# Patient Record
Sex: FEMALE | Race: WHITE | NOT HISPANIC OR LATINO | Employment: OTHER | URBAN - METROPOLITAN AREA
[De-identification: names, ages, dates, MRNs, and addresses within clinical notes are randomized per-mention and may not be internally consistent; named-entity substitution may affect disease eponyms.]

---

## 2024-02-19 ENCOUNTER — OFFICE VISIT (OUTPATIENT)
Dept: INTERNAL MEDICINE | Facility: CLINIC | Age: 69
End: 2024-02-19
Payer: MEDICARE

## 2024-02-19 VITALS
HEART RATE: 64 BPM | SYSTOLIC BLOOD PRESSURE: 114 MMHG | DIASTOLIC BLOOD PRESSURE: 68 MMHG | WEIGHT: 148.2 LBS | RESPIRATION RATE: 16 BRPM | BODY MASS INDEX: 22.46 KG/M2 | HEIGHT: 68 IN | OXYGEN SATURATION: 97 % | TEMPERATURE: 96.8 F

## 2024-02-19 DIAGNOSIS — Z11.59 ENCOUNTER FOR HEPATITIS C SCREENING TEST FOR LOW RISK PATIENT: ICD-10-CM

## 2024-02-19 DIAGNOSIS — Z12.31 ENCOUNTER FOR SCREENING MAMMOGRAM FOR MALIGNANT NEOPLASM OF BREAST: ICD-10-CM

## 2024-02-19 DIAGNOSIS — E03.9 HYPOTHYROIDISM (ACQUIRED): Primary | ICD-10-CM

## 2024-02-19 DIAGNOSIS — Z12.11 COLON CANCER SCREENING: ICD-10-CM

## 2024-02-19 DIAGNOSIS — Z78.0 POSTMENOPAUSAL: ICD-10-CM

## 2024-02-19 RX ORDER — LEVOTHYROXINE SODIUM 0.12 MG/1
125 TABLET ORAL
Qty: 30 TABLET | Refills: 3 | Status: SHIPPED | OUTPATIENT
Start: 2024-02-19

## 2024-02-19 NOTE — PROGRESS NOTES
Subjective   Patricia Behrens is a 68 y.o. female here to establish care.  Chief Complaint   Patient presents with    [facility]    Hypothyroidism       History of Present Illness   The patient is a 68-year-old female who is here to establish care. She has hypothyroidism and is in need of a refill of her levothyroxine. She currently takes 125 mcg; however, she has not been taking this routinely. She did move to [place] in the summer last year to be near family and has not had routine care since then.    She reports she had an overactive thyroid as a kid. She states in her 40s or 50s, she was informed she has an underactive thyroid. She acknowledges she has been on medication for several years. She denies undergoing any procedures or surgeries on her thyroid. She states she ran out of her levothyroxine. She notes she presented to urgent care a few months ago for strep throat and was given a prescription for a month. She took it for a month and never followed up. She reports she is falling asleep and gaining weight. She states she did take levothyroxine 150 mcg in the past but dose had to be reduced to 125mcg and she was stable on that.     She reports it has been several years since her last mammogram. Her last bone density screening was more than 2 years ago. She had a Cologuard approximately 5 years ago, which was negative. She obtained a colonoscopy in the past and notes she woke up in the middle of the procedure. She notes she has not had a Medicare wellness visit before.     She states her brother  of rectal cancer.    She declines the COVID-19 vaccination today. She is unsure when she obtained her tetanus vaccination. She reports she obtained a whooping cough vaccination 12 years ago. She declines the shingles vaccination and RSV vaccination.      The following portions of the patient's history were reviewed and updated as appropriate: allergies, current medications, past family history, past medical  "history, past social history, past surgical history and problem list.    Review of Systems   Constitutional:  Positive for fatigue and unexpected weight gain. Negative for fever and unexpected weight loss.   Eyes:  Negative for blurred vision, double vision and visual disturbance.   Respiratory:  Negative for cough, shortness of breath and wheezing.    Cardiovascular:  Negative for chest pain, palpitations and leg swelling.   Gastrointestinal:  Negative for abdominal pain, constipation, diarrhea, nausea and vomiting.   Genitourinary:  Negative for difficulty urinating, frequency and urgency.   Musculoskeletal:  Negative for arthralgias and myalgias.   Skin:  Negative for color change and rash.   Neurological:  Negative for dizziness, weakness and headache.   Hematological:  Negative for adenopathy. Does not bruise/bleed easily.           No Known Allergies  Past Medical History:   Diagnosis Date    Disease of thyroid gland     Hypothyroidism      Past Surgical History:   Procedure Laterality Date     SECTION      OTHER SURGICAL HISTORY      cervical ribs     Family History   Problem Relation Age of Onset    Cancer Brother         rectal     Social History     Socioeconomic History    Marital status:    Tobacco Use    Smoking status: Never    Smokeless tobacco: Never   Vaping Use    Vaping Use: Never used   Substance and Sexual Activity    Alcohol use: Yes     Alcohol/week: 2.0 standard drinks of alcohol     Types: 2 Glasses of wine per week    Drug use: Never    Sexual activity: Not Currently     Immunization History   Administered Date(s) Administered    Tdap 2012       Current Outpatient Medications:     levothyroxine (SYNTHROID, LEVOTHROID) 125 MCG tablet, Take 1 tablet by mouth Every Morning., Disp: 30 tablet, Rfl: 3    Objective   Blood pressure 114/68, pulse 64, temperature 96.8 °F (36 °C), temperature source Infrared, resp. rate 16, height 172.7 cm (68\"), weight 67.2 kg (148 lb " 3.2 oz), SpO2 97%.  Physical Exam  Constitutional:       Appearance: Normal appearance. She is well-developed.   HENT:      Head: Normocephalic and atraumatic.   Eyes:      General: No scleral icterus.     Conjunctiva/sclera: Conjunctivae normal.   Neck:      Comments: No thyromegaly. No carotid bruits.  Cardiovascular:      Rate and Rhythm: Normal rate and regular rhythm.      Heart sounds: Normal heart sounds.   Pulmonary:      Effort: Pulmonary effort is normal. No respiratory distress.      Breath sounds: Normal breath sounds.   Abdominal:      General: Bowel sounds are normal.      Palpations: Abdomen is soft.      Tenderness: There is no abdominal tenderness.   Musculoskeletal:         General: Normal range of motion.      Cervical back: Normal range of motion.      Right lower leg: No edema.      Left lower leg: No edema.   Skin:     General: Skin is warm and dry.   Neurological:      General: No focal deficit present.      Mental Status: She is alert and oriented to person, place, and time.   Psychiatric:         Attention and Perception: Attention normal.         Mood and Affect: Mood and affect normal.         Behavior: Behavior normal. Behavior is cooperative.         Thought Content: Thought content normal.         Cognition and Memory: Cognition normal.         Judgment: Judgment normal.         Assessment & Plan   Diagnoses and all orders for this visit:    1. Hypothyroidism (acquired) (Primary)  -     TSH Rfx On Abnormal To Free T4; Future  -     CBC (No Diff); Future  -     Comprehensive Metabolic Panel; Future  -     Lipid Panel; Future  -     levothyroxine (SYNTHROID, LEVOTHROID) 125 MCG tablet; Take 1 tablet by mouth Every Morning.  Dispense: 30 tablet; Refill: 3    2. Encounter for screening mammogram for malignant neoplasm of breast  -     Mammo screening digital tomosynthesis bilateral w CAD; Future  -     CBC (No Diff); Future  -     Comprehensive Metabolic Panel; Future  -     Lipid Panel;  Future    3. Postmenopausal  -     DEXA Bone Density Axial; Future  -     CBC (No Diff); Future  -     Comprehensive Metabolic Panel; Future  -     Lipid Panel; Future    4. Colon cancer screening  -     Cologuard - Stool, Per Rectum; Future    5. Encounter for hepatitis C screening test for low risk patient  -     HCV Antibody Rfx To Qnt PCR; Future  -     CBC (No Diff); Future  -     Comprehensive Metabolic Panel; Future  -     Lipid Panel; Future      Hypothyroidism.  - I will go ahead and get her started back on the levothyroxine 125 mcg daily. Plan to check her thyroid labs in 3 months. Labs ordered and she can get these prior to our next visit.    Preventative care.  - Mammogram ordered. Bone density ordered. Cologuard ordered. We will screen for hepatitis C with labs prior to next visit.         Return in about 3 months (around 5/19/2024) for Medicare Wellness with labs a few days prior.  Plan of care discussed with pt. They verbalized understanding and agreement.     Transcribed from ambient dictation for FAMILIA Adames by Elmer Pedroza.  02/19/24   12:33 EST    Patient or patient representative verbalized consent to the visit recording.  I have personally performed the services described in this document as transcribed by the above individual, and it is both accurate and complete.  FAMILIA Adames  2/19/2024  18:42 EST

## 2024-02-23 ENCOUNTER — PATIENT ROUNDING (BHMG ONLY) (OUTPATIENT)
Dept: INTERNAL MEDICINE | Facility: CLINIC | Age: 69
End: 2024-02-23
Payer: MEDICARE

## 2024-02-23 NOTE — PROGRESS NOTES
My name is Lee Ann Beck and I am a patient experience representative for [facility] on [facility].    I would like to officially welcome you to our practice.  If you do not mind I would like to ask you a few questions about your recent visit with us.  If you do not have the time to reply that is perfectly fine.      First, could you tell me what went well with your recent visit?     Secondly, we are always looking for ways to make our patients' experiences even better. Do you have any recommendations on ways we may improve?     Third, safety is a top priority therefore do you have any concerns with that while in our office?    Finally, overall were you satisfied with your first visit to our practice?     Thank you for taking the time to answer a few questions today.  In the coming days you will receive a survey.  We encourage you to complete the survey to let us know how we did!        Lee Ann

## 2024-05-21 ENCOUNTER — OFFICE VISIT (OUTPATIENT)
Dept: INTERNAL MEDICINE | Facility: CLINIC | Age: 69
End: 2024-05-21
Payer: MEDICARE

## 2024-05-21 ENCOUNTER — LAB (OUTPATIENT)
Dept: INTERNAL MEDICINE | Facility: CLINIC | Age: 69
End: 2024-05-21
Payer: MEDICARE

## 2024-05-21 VITALS
TEMPERATURE: 96.8 F | SYSTOLIC BLOOD PRESSURE: 118 MMHG | OXYGEN SATURATION: 99 % | WEIGHT: 137.6 LBS | BODY MASS INDEX: 20.85 KG/M2 | DIASTOLIC BLOOD PRESSURE: 66 MMHG | HEART RATE: 64 BPM | RESPIRATION RATE: 18 BRPM | HEIGHT: 68 IN

## 2024-05-21 DIAGNOSIS — E03.9 HYPOTHYROIDISM (ACQUIRED): ICD-10-CM

## 2024-05-21 DIAGNOSIS — Z78.0 POSTMENOPAUSAL: ICD-10-CM

## 2024-05-21 DIAGNOSIS — Z11.59 ENCOUNTER FOR HEPATITIS C SCREENING TEST FOR LOW RISK PATIENT: ICD-10-CM

## 2024-05-21 DIAGNOSIS — Z12.31 ENCOUNTER FOR SCREENING MAMMOGRAM FOR MALIGNANT NEOPLASM OF BREAST: ICD-10-CM

## 2024-05-21 DIAGNOSIS — Z00.00 ENCOUNTER FOR SUBSEQUENT ANNUAL WELLNESS VISIT (AWV) IN MEDICARE PATIENT: Primary | ICD-10-CM

## 2024-05-21 LAB
DEPRECATED RDW RBC AUTO: 41.6 FL (ref 37–54)
ERYTHROCYTE [DISTWIDTH] IN BLOOD BY AUTOMATED COUNT: 12.3 % (ref 12.3–15.4)
HCT VFR BLD AUTO: 39.7 % (ref 34–46.6)
HGB BLD-MCNC: 13.4 G/DL (ref 12–15.9)
MCH RBC QN AUTO: 31.5 PG (ref 26.6–33)
MCHC RBC AUTO-ENTMCNC: 33.8 G/DL (ref 31.5–35.7)
MCV RBC AUTO: 93.4 FL (ref 79–97)
PLATELET # BLD AUTO: 221 10*3/MM3 (ref 140–450)
PMV BLD AUTO: 10.3 FL (ref 6–12)
RBC # BLD AUTO: 4.25 10*6/MM3 (ref 3.77–5.28)
WBC NRBC COR # BLD AUTO: 4.98 10*3/MM3 (ref 3.4–10.8)

## 2024-05-21 PROCEDURE — 1160F RVW MEDS BY RX/DR IN RCRD: CPT | Performed by: NURSE PRACTITIONER

## 2024-05-21 PROCEDURE — 1170F FXNL STATUS ASSESSED: CPT | Performed by: NURSE PRACTITIONER

## 2024-05-21 PROCEDURE — 99213 OFFICE O/P EST LOW 20 MIN: CPT | Performed by: NURSE PRACTITIONER

## 2024-05-21 PROCEDURE — 84443 ASSAY THYROID STIM HORMONE: CPT | Performed by: NURSE PRACTITIONER

## 2024-05-21 PROCEDURE — 85027 COMPLETE CBC AUTOMATED: CPT | Performed by: NURSE PRACTITIONER

## 2024-05-21 PROCEDURE — 80061 LIPID PANEL: CPT | Performed by: NURSE PRACTITIONER

## 2024-05-21 PROCEDURE — 1159F MED LIST DOCD IN RCRD: CPT | Performed by: NURSE PRACTITIONER

## 2024-05-21 PROCEDURE — 86803 HEPATITIS C AB TEST: CPT | Performed by: NURSE PRACTITIONER

## 2024-05-21 PROCEDURE — 80053 COMPREHEN METABOLIC PANEL: CPT | Performed by: NURSE PRACTITIONER

## 2024-05-21 PROCEDURE — G0439 PPPS, SUBSEQ VISIT: HCPCS | Performed by: NURSE PRACTITIONER

## 2024-05-21 PROCEDURE — 36415 COLL VENOUS BLD VENIPUNCTURE: CPT | Performed by: NURSE PRACTITIONER

## 2024-05-21 NOTE — PROGRESS NOTES
"The ABCs of the Annual Wellness Visit  Subsequent Medicare Wellness Visit    Subjective    Patricia Behrens is a 69 y.o. female who presents for a Subsequent Medicare Wellness Visit.    The following portions of the patient's history were reviewed and   updated as appropriate: .    Compared to one year ago, the patient feels her physical   health is the same.    Compared to one year ago, the patient feels her mental   health is the same.    Recent Hospitalizations:  She was not admitted to the hospital during the last year.       Current Medical Providers:  Patient Care Team:  Lynda Rodriguez APRN as PCP - General (Nurse Practitioner)    Outpatient Medications Prior to Visit   Medication Sig Dispense Refill    BIOTIN PO Take  by mouth.      Collagenase powder Use.      levothyroxine (SYNTHROID, LEVOTHROID) 125 MCG tablet Take 1 tablet by mouth Every Morning. 30 tablet 3    vitamin D3 125 MCG (5000 UT) capsule capsule Take 1 capsule by mouth Daily.       No facility-administered medications prior to visit.       No opioid medication identified on active medication list. I have reviewed chart for other potential  high risk medication/s and harmful drug interactions in the elderly.        Aspirin is not on active medication list.  Aspirin use is not indicated based on review of current medical condition/s. Risk of harm outweighs potential benefits.  .    Patient Active Problem List   Diagnosis    Hypothyroidism (acquired)     Advance Care Planning   Advance Care Planning     Advance Directive is not on file.  ACP discussion was held with the patient during this visit. Patient does not have an advance directive, information provided.     Objective    Vitals:    05/21/24 1045   BP: 118/66   BP Location: Right arm   Patient Position: Sitting   Cuff Size: Adult   Pulse: 64   Resp: 18   Temp: 96.8 °F (36 °C)   TempSrc: Infrared   SpO2: 99%   Weight: 62.4 kg (137 lb 9.6 oz)   Height: 172.7 cm (68\")     Estimated body mass " "index is 20.92 kg/m² as calculated from the following:    Height as of this encounter: 172.7 cm (68\").    Weight as of this encounter: 62.4 kg (137 lb 9.6 oz).    BMI is within normal parameters. No other follow-up for BMI required.      Does the patient have evidence of cognitive impairment? No          HEALTH RISK ASSESSMENT    Smoking Status:  Social History     Tobacco Use   Smoking Status Never   Smokeless Tobacco Never     Alcohol Consumption:  Social History     Substance and Sexual Activity   Alcohol Use Yes    Alcohol/week: 2.0 standard drinks of alcohol    Types: 2 Glasses of wine per week     Fall Risk Screen:    STEADI Fall Risk Assessment was completed, and patient is at LOW risk for falls.Assessment completed on:2024    Depression Screenin/21/2024    10:43 AM   PHQ-2/PHQ-9 Depression Screening   Little Interest or Pleasure in Doing Things 0-->not at all   Feeling Down, Depressed or Hopeless 0-->not at all   PHQ-9: Brief Depression Severity Measure Score 0       Health Habits and Functional and Cognitive Screenin/21/2024    10:36 AM   Functional & Cognitive Status   Do you have difficulty preparing food and eating? No   Do you have difficulty bathing yourself, getting dressed or grooming yourself? No   Do you have difficulty using the toilet? No   Do you have difficulty moving around from place to place? No   Do you have trouble with steps or getting out of a bed or a chair? No   Current Diet Well Balanced Diet   Dental Exam Up to date   Eye Exam Up to date   Exercise (times per week) 0 times per week   Current Exercises Include No Regular Exercise   Do you need help using the phone?  No   Are you deaf or do you have serious difficulty hearing?  No   Do you need help to go to places out of walking distance? No   Do you need help shopping? No   Do you need help preparing meals?  No   Do you need help with housework?  No   Do you need help with laundry? No   Do you need help taking " your medications? No   Do you need help managing money? No   Do you ever drive or ride in a car without wearing a seat belt? No   Have you felt unusual stress, anger or loneliness in the last month? No   Who do you live with? Alone   If you need help, do you have trouble finding someone available to you? No   Have you been bothered in the last four weeks by sexual problems? No   Do you have difficulty concentrating, remembering or making decisions? No       Age-appropriate Screening Schedule:  Refer to the list below for future screening recommendations based on patient's age, sex and/or medical conditions. Orders for these recommended tests are listed in the plan section. The patient has been provided with a written plan.    Health Maintenance   Topic Date Due    MAMMOGRAM  Never done    DXA SCAN  Never done    ZOSTER VACCINE (1 of 2) Never done    COLORECTAL CANCER SCREENING  01/01/2021    TDAP/TD VACCINES (2 - Td or Tdap) 01/01/2022    HEPATITIS C SCREENING  Never done    ANNUAL WELLNESS VISIT  Never done    COVID-19 Vaccine (1 - 2023-24 season) 05/23/2024 (Originally 9/1/2023)    RSV Vaccine - Adults (1 - 1-dose 60+ series) 02/19/2025 (Originally 4/4/2015)    Pneumococcal Vaccine 65+ (1 of 1 - PCV) 02/19/2025 (Originally 4/4/2020)    INFLUENZA VACCINE  08/01/2024                  CMS Preventative Services Quick Reference  Risk Factors Identified During Encounter  Immunizations Discussed/Encouraged: Td, Shingrix, COVID19, and RSV (Respiratory Syncytial Virus)  Inactivity/Sedentary: Patient was advised to exercise at least 150 minutes a week per CDC recommendations.  The above risks/problems have been discussed with the patient.  Pertinent information has been shared with the patient in the After Visit Summary.  An After Visit Summary and PPPS were made available to the patient.    Follow Up:   Next Medicare Wellness visit to be scheduled in 1 year.       Additional E&M Note during same encounter follows:  Patient  "has multiple medical problems which are significant and separately identifiable that require additional work above and beyond the Medicare Wellness Visit.      Chief Complaint  Medicare Wellness-subsequent    Subjective        History of Present Illness  The patient presents for a wellness visit.    The patient has yet to complete the Cologuard kit, despite receiving it via mail. Her last test was performed in 2018. A mammogram was ordered during her last visit, however, she did not complete it. She expresses a desire to undergo a bone density screening. She has not received the shingles vaccine and is not interested in receiving it. She received her influenza vaccine in 2012. She has declined the RSV, pneumonia, and COVID-19 vaccines. Her health and mental health have remained stable over the past year. She has not required overnight hospitalization in the past year. She does not take aspirin daily. Her eye and dental exams are up to date. She maintains a healthy diet, albeit not engaging in regular exercise. She has been intentionally losing weight through dieting, with a goal to lose 10 pounds. She denies experiencing unusual headaches, dizziness, chest pain, shortness of breath, heart palpitations, abdominal pain, nausea, vomiting, diarrhea, constipation, or heartburn. She does, however, report easy bruising, which she attributes to aging.   She does not smoke tobacco products. She drinks alcohol a couple of times a week.    Objective   Vital Signs:  /66 (BP Location: Right arm, Patient Position: Sitting, Cuff Size: Adult)   Pulse 64   Temp 96.8 °F (36 °C) (Infrared)   Resp 18   Ht 172.7 cm (68\")   Wt 62.4 kg (137 lb 9.6 oz)   SpO2 99%   BMI 20.92 kg/m²   Physical Exam  Constitutional:       Appearance: Normal appearance. She is well-developed.   HENT:      Head: Normocephalic and atraumatic.   Eyes:      General: No scleral icterus.     Conjunctiva/sclera: Conjunctivae normal.   Cardiovascular:     "  Rate and Rhythm: Normal rate and regular rhythm.      Heart sounds: Normal heart sounds.   Pulmonary:      Effort: Pulmonary effort is normal. No respiratory distress.      Breath sounds: Normal breath sounds.   Abdominal:      General: Bowel sounds are normal.      Palpations: Abdomen is soft.      Tenderness: There is no abdominal tenderness.   Musculoskeletal:         General: Normal range of motion.      Cervical back: Normal range of motion.      Right lower leg: No edema.      Left lower leg: No edema.   Skin:     General: Skin is warm and dry.   Neurological:      General: No focal deficit present.      Mental Status: She is alert and oriented to person, place, and time.   Psychiatric:         Attention and Perception: Attention normal.         Mood and Affect: Mood and affect normal.         Behavior: Behavior normal. Behavior is cooperative.         Thought Content: Thought content normal.         Cognition and Memory: Cognition normal.         Judgment: Judgment normal.            Assessment and Plan   Assessment & Plan  1. Annual wellness examination.  The patient does not exhibit significant cardiovascular risk factors, nor does she exhibit hypertension or hyperlipidemia. There is no evidence of cognitive impairment. Her Body Mass Index (BMI) is 20.9, which is within the optimal range for her. A mammogram will be ordered. Additionally, a bone density screening will be ordered. Blood work will be conducted today. Encouraged to complete cologuard.   2. Hypothyroidism   Check labs   Cont levothyroxine- adjust dose based on labs if warranted.     Follow-up  The patient is scheduled for a follow-up visit in 1 year.           Follow Up   No follow-ups on file.  Patient was given instructions and counseling regarding her condition or for health maintenance advice. Please see specific information pulled into the AVS if appropriate.     Patient or patient representative verbalized consent for the use of Ambient  Listening during the visit with  FAMILIA Adames for chart documentation. 5/21/2024  11:17 EDT

## 2024-05-21 NOTE — PATIENT INSTRUCTIONS
Medicare Wellness  Personal Prevention Plan of Service     Date of Office Visit:    Encounter Provider:  FAMILIA Adames  Place of Service:  [facility]  Patient Name: Patricia Behrens  :  1955    As part of the Medicare Wellness portion of your visit today, we are providing you with this personalized preventive plan of services (PPPS). This plan is based upon recommendations of the [place] Preventive Services Task Force (USPSTF) and the Advisory Committee on Immunization Practices (ACIP).    This lists the preventive care services that should be considered, and provides dates of when you are due. Items listed as completed are up-to-date and do not require any further intervention.    Health Maintenance   Topic Date Due    MAMMOGRAM  Never done    DXA SCAN  Never done    COLORECTAL CANCER SCREENING  2021    HEPATITIS C SCREENING  Never done    ZOSTER VACCINE (1 of 2) 2024 (Originally 2005)    COVID-19 Vaccine (1 - -24 season) 2024 (Originally 2023)    RSV Vaccine - Adults (1 - 1-dose 60+ series) 2025 (Originally 2015)    Pneumococcal Vaccine 65+ (1 of 1 - PCV) 2025 (Originally 2020)    TDAP/TD VACCINES (2 - Td or Tdap) 2025 (Originally 2022)    INFLUENZA VACCINE  2024    ANNUAL WELLNESS VISIT  2025       Orders Placed This Encounter   Procedures    Mammo screening digital tomosynthesis bilateral w CAD     Standing Status:   Future     Standing Expiration Date:   2025     Order Specific Question:   Reason for Exam:     Answer:   breast cancer screening     Order Specific Question:   Release to patient     Answer:   Routine Release [[id number]]    DEXA Bone Density Axial     Order Specific Question:   Is patient taking or have taken long term Glucocorticoid (steroids)?     Answer:   No     Order Specific Question:   Does the patient have rheumatoid arthritis?     Answer:   No     Order  Specific Question:   Does the patient have secondary osteoporosis?     Answer:   No     Order Specific Question:   Reason for Exam:     Answer:   screen     Order Specific Question:   Release to patient     Answer:   Routine Release [[id number]]       Return in about 1 year (around 5/21/2025) for Medicare Wellness, and need to collect labs today.

## 2024-05-22 LAB
ALBUMIN SERPL-MCNC: 4.7 G/DL (ref 3.5–5.2)
ALBUMIN/GLOB SERPL: 2.1 G/DL
ALP SERPL-CCNC: 29 U/L (ref 39–117)
ALT SERPL W P-5'-P-CCNC: 12 U/L (ref 1–33)
ANION GAP SERPL CALCULATED.3IONS-SCNC: 13 MMOL/L (ref 5–15)
AST SERPL-CCNC: 15 U/L (ref 1–32)
BILIRUB SERPL-MCNC: 0.4 MG/DL (ref 0–1.2)
BUN SERPL-MCNC: 16 MG/DL (ref 8–23)
BUN/CREAT SERPL: 21.6 (ref 7–25)
CALCIUM SPEC-SCNC: 9.5 MG/DL (ref 8.6–10.5)
CHLORIDE SERPL-SCNC: 103 MMOL/L (ref 98–107)
CHOLEST SERPL-MCNC: 189 MG/DL (ref 0–200)
CO2 SERPL-SCNC: 26 MMOL/L (ref 22–29)
CREAT SERPL-MCNC: 0.74 MG/DL (ref 0.57–1)
EGFRCR SERPLBLD CKD-EPI 2021: 87.7 ML/MIN/1.73
GLOBULIN UR ELPH-MCNC: 2.2 GM/DL
GLUCOSE SERPL-MCNC: 92 MG/DL (ref 65–99)
HDLC SERPL-MCNC: 72 MG/DL (ref 40–60)
LDLC SERPL CALC-MCNC: 98 MG/DL (ref 0–100)
LDLC/HDLC SERPL: 1.32 {RATIO}
POTASSIUM SERPL-SCNC: 4.4 MMOL/L (ref 3.5–5.2)
PROT SERPL-MCNC: 6.9 G/DL (ref 6–8.5)
SODIUM SERPL-SCNC: 142 MMOL/L (ref 136–145)
TRIGL SERPL-MCNC: 109 MG/DL (ref 0–150)
TSH SERPL DL<=0.05 MIU/L-ACNC: 0.87 UIU/ML (ref 0.27–4.2)
VLDLC SERPL-MCNC: 19 MG/DL (ref 5–40)

## 2024-05-23 DIAGNOSIS — E03.9 HYPOTHYROIDISM (ACQUIRED): ICD-10-CM

## 2024-05-23 LAB
HCV AB SERPL QL IA: NORMAL
HCV IGG SERPL QL IA: NON REACTIVE

## 2024-05-23 RX ORDER — LEVOTHYROXINE SODIUM 0.12 MG/1
125 TABLET ORAL
Qty: 90 TABLET | Refills: 1 | Status: SHIPPED | OUTPATIENT
Start: 2024-05-23

## 2024-05-23 NOTE — TELEPHONE ENCOUNTER
Rx Refill Note  Requested Prescriptions     Pending Prescriptions Disp Refills    levothyroxine (SYNTHROID, LEVOTHROID) 125 MCG tablet 90 tablet 1     Sig: Take 1 tablet by mouth Every Morning.      Last office visit with prescribing clinician: 5/21/2024     Next office visit with prescribing clinician: 5/23/2025       Miya Dickey MA  05/23/24, 11:10 EDT

## 2024-07-29 DIAGNOSIS — Z12.31 ENCOUNTER FOR SCREENING MAMMOGRAM FOR MALIGNANT NEOPLASM OF BREAST: ICD-10-CM

## 2024-07-29 DIAGNOSIS — Z78.0 POSTMENOPAUSAL: Primary | ICD-10-CM

## 2025-02-16 DIAGNOSIS — E03.9 HYPOTHYROIDISM (ACQUIRED): ICD-10-CM

## 2025-02-18 RX ORDER — LEVOTHYROXINE SODIUM 125 UG/1
125 TABLET ORAL EVERY MORNING
Qty: 90 TABLET | Refills: 0 | Status: SHIPPED | OUTPATIENT
Start: 2025-02-18

## 2025-02-18 NOTE — TELEPHONE ENCOUNTER
Rx Refill Note  Requested Prescriptions     Pending Prescriptions Disp Refills    levothyroxine (SYNTHROID, LEVOTHROID) 125 MCG tablet [Pharmacy Med Name: LEVOTHYROXINE 125 MCG TABLET] 90 tablet 0     Sig: TAKE 1 TABLET BY MOUTH EVERY DAY IN THE MORNING      Last office visit with prescribing clinician: 5/21/2024     Next office visit with prescribing clinician: 5/23/2025       Margret Stevens  02/18/25, 13:18 EST

## 2025-05-29 DIAGNOSIS — E03.9 HYPOTHYROIDISM (ACQUIRED): ICD-10-CM

## 2025-05-30 ENCOUNTER — OFFICE VISIT (OUTPATIENT)
Dept: INTERNAL MEDICINE | Facility: CLINIC | Age: 70
End: 2025-05-30
Payer: MEDICARE

## 2025-05-30 ENCOUNTER — LAB (OUTPATIENT)
Dept: INTERNAL MEDICINE | Facility: CLINIC | Age: 70
End: 2025-05-30
Payer: MEDICARE

## 2025-05-30 VITALS
HEIGHT: 68 IN | SYSTOLIC BLOOD PRESSURE: 112 MMHG | BODY MASS INDEX: 21.31 KG/M2 | WEIGHT: 140.6 LBS | TEMPERATURE: 97.2 F | HEART RATE: 59 BPM | OXYGEN SATURATION: 98 % | DIASTOLIC BLOOD PRESSURE: 62 MMHG

## 2025-05-30 DIAGNOSIS — G43.109 OCULAR MIGRAINE: ICD-10-CM

## 2025-05-30 DIAGNOSIS — Z12.11 SCREEN FOR COLON CANCER: ICD-10-CM

## 2025-05-30 DIAGNOSIS — E03.9 HYPOTHYROIDISM (ACQUIRED): ICD-10-CM

## 2025-05-30 DIAGNOSIS — Z00.00 ENCOUNTER FOR SUBSEQUENT ANNUAL WELLNESS VISIT (AWV) IN MEDICARE PATIENT: ICD-10-CM

## 2025-05-30 DIAGNOSIS — Z00.00 ENCOUNTER FOR SUBSEQUENT ANNUAL WELLNESS VISIT (AWV) IN MEDICARE PATIENT: Primary | ICD-10-CM

## 2025-05-30 DIAGNOSIS — Z13.820 SCREENING FOR OSTEOPOROSIS: ICD-10-CM

## 2025-05-30 DIAGNOSIS — F43.21 GRIEF AT LOSS OF CHILD: ICD-10-CM

## 2025-05-30 DIAGNOSIS — Z12.31 BREAST CANCER SCREENING BY MAMMOGRAM: ICD-10-CM

## 2025-05-30 DIAGNOSIS — Z63.4 GRIEF AT LOSS OF CHILD: ICD-10-CM

## 2025-05-30 DIAGNOSIS — Z78.0 POSTMENOPAUSAL: ICD-10-CM

## 2025-05-30 LAB
DEPRECATED RDW RBC AUTO: 44.2 FL (ref 37–54)
ERYTHROCYTE [DISTWIDTH] IN BLOOD BY AUTOMATED COUNT: 12.9 % (ref 12.3–15.4)
HCT VFR BLD AUTO: 41.7 % (ref 34–46.6)
HGB BLD-MCNC: 13.9 G/DL (ref 12–15.9)
MCH RBC QN AUTO: 31.4 PG (ref 26.6–33)
MCHC RBC AUTO-ENTMCNC: 33.3 G/DL (ref 31.5–35.7)
MCV RBC AUTO: 94.3 FL (ref 79–97)
PLATELET # BLD AUTO: 235 10*3/MM3 (ref 140–450)
PMV BLD AUTO: 10 FL (ref 6–12)
RBC # BLD AUTO: 4.42 10*6/MM3 (ref 3.77–5.28)
TSH SERPL DL<=0.05 MIU/L-ACNC: 5.21 UIU/ML (ref 0.27–4.2)
WBC NRBC COR # BLD AUTO: 5.39 10*3/MM3 (ref 3.4–10.8)

## 2025-05-30 PROCEDURE — 80061 LIPID PANEL: CPT | Performed by: NURSE PRACTITIONER

## 2025-05-30 PROCEDURE — 85027 COMPLETE CBC AUTOMATED: CPT | Performed by: NURSE PRACTITIONER

## 2025-05-30 PROCEDURE — 84439 ASSAY OF FREE THYROXINE: CPT | Performed by: NURSE PRACTITIONER

## 2025-05-30 PROCEDURE — 84443 ASSAY THYROID STIM HORMONE: CPT | Performed by: NURSE PRACTITIONER

## 2025-05-30 PROCEDURE — 36415 COLL VENOUS BLD VENIPUNCTURE: CPT | Performed by: NURSE PRACTITIONER

## 2025-05-30 PROCEDURE — 80053 COMPREHEN METABOLIC PANEL: CPT | Performed by: NURSE PRACTITIONER

## 2025-05-30 RX ORDER — LEVOTHYROXINE SODIUM 125 UG/1
125 TABLET ORAL EVERY MORNING
Qty: 90 TABLET | Refills: 3 | Status: SHIPPED | OUTPATIENT
Start: 2025-05-30

## 2025-05-30 RX ORDER — LEVOTHYROXINE SODIUM 125 UG/1
125 TABLET ORAL EVERY MORNING
Qty: 90 TABLET | Refills: 0 | OUTPATIENT
Start: 2025-05-30

## 2025-05-30 SDOH — SOCIAL STABILITY - SOCIAL INSECURITY: DISSAPEARANCE AND DEATH OF FAMILY MEMBER: Z63.4

## 2025-05-30 NOTE — PATIENT INSTRUCTIONS
Medicare Wellness  Personal Prevention Plan of Service     Date of Office Visit:    Encounter Provider:  FAMILIA Adames  Place of Service:  [facility]  Patient Name: Patricia Behrens  :  1955    As part of the Medicare Wellness portion of your visit today, we are providing you with this personalized preventive plan of services (PPPS). This plan is based upon recommendations of the [place] Preventive Services Task Force (USPSTF) and the Advisory Committee on Immunization Practices (ACIP).    This lists the preventive care services that should be considered, and provides dates of when you are due. Items listed as completed are up-to-date and do not require any further intervention.    Health Maintenance   Topic Date Due    DXA SCAN  Never done    MAMMOGRAM  Never done    COLORECTAL CANCER SCREENING  2021    ANNUAL WELLNESS VISIT  2025    Pneumococcal Vaccine 50+ (1 of 1 - PCV) 2025 (Originally 2005)    TDAP/TD VACCINES (2 - Td or Tdap) 2025 (Originally 2022)    ZOSTER VACCINE (1 of 2) 2025 (Originally 2005)    COVID-19 Vaccine (1 - - season) 2025 (Originally 2024)    INFLUENZA VACCINE  2025    HEPATITIS C SCREENING  Completed       Orders Placed This Encounter   Procedures    Mammo Screening Digital Tomosynthesis Bilateral With CAD     Standing Status:   Future     Expected Date:   2025     Expiration Date:   2026     Reason for Exam::   screen     Release to patient:   Routine Release [[id number]]    DEXA Bone Density Axial     Standing Status:   Future     Expected Date:   2025     Expiration Date:   2026     Reason for Exam::   osteoporosis screening/postmenoopausal     Release to patient:   Routine Release [[id number]]     Is patient taking or have taken long term Glucocorticoid (steroids)?:   No     Does the patient have rheumatoid arthritis?:   No     Does the patient have  secondary osteoporosis?:   No    CBC (No Diff)     Standing Status:   Future     Expected Date:   11/26/2025     Expiration Date:   5/30/2026     Release to patient:   Routine Release [[id number]]    Comprehensive Metabolic Panel     Standing Status:   Future     Expected Date:   11/26/2025     Expiration Date:   5/30/2026     Release to patient:   Routine Release [[id number]]    Lipid Panel     Standing Status:   Future     Expected Date:   11/26/2025     Expiration Date:   5/30/2026     Release to patient:   Routine Release [[id number]]    TSH Rfx On Abnormal To Free T4     Standing Status:   Future     Expected Date:   11/26/2025     Release to patient:   Routine Release [[id number]]    Cologuard - Stool, Per Rectum     Standing Status:   Future     Expected Date:   5/30/2025     Expiration Date:   5/30/2026     Release to patient:   Routine Release [[id number]]    Ambulatory Referral to Behavioral Health     Referral Priority:   Routine     Referral Type:   Behavorial Health/Psych     Referral Reason:   Specialty Services Required     Requested Specialty:   Behavioral Health     Number of Visits Requested:   1       Return in about 1 year (around 5/30/2026) for Medicare Wellness, and need to collect labs today.

## 2025-05-30 NOTE — PROGRESS NOTES
Subjective   The ABCs of the Annual Wellness Visit  Medicare Wellness Visit      Patricia Behrens is a 70 y.o. patient who presents for a Medicare Wellness Visit.    The following portions of the patient's history were reviewed and   updated as appropriate: allergies, current medications, past family history, past medical history, past social history, past surgical history, and problem list.    Compared to one year ago, the patient's physical   health is the same.  Compared to one year ago, the patient's mental   health is the same.    Recent Hospitalizations:  She was not admitted to the hospital during the last year.     Current Medical Providers:  Patient Care Team:  Lynda Rodriguez APRN as PCP - General (Nurse Practitioner)    Outpatient Medications Prior to Visit   Medication Sig Dispense Refill    BIOTIN PO Take  by mouth.      Collagenase powder Use.      vitamin D3 125 MCG (5000 UT) capsule capsule Take 1 capsule by mouth Daily.      levothyroxine (SYNTHROID, LEVOTHROID) 125 MCG tablet TAKE 1 TABLET BY MOUTH EVERY DAY IN THE MORNING 90 tablet 0     No facility-administered medications prior to visit.     No opioid medication identified on active medication list. I have reviewed chart for other potential  high risk medication/s and harmful drug interactions in the elderly.      Aspirin is not on active medication list.  Aspirin use is not indicated based on review of current medical condition/s. Risk of harm outweighs potential benefits.  .    Patient Active Problem List   Diagnosis    Hypothyroidism (acquired)    Ocular migraine    Grief at loss of child     Advance Care Planning Advance Directive is not on file.  ACP discussion was held with the patient during this visit. Patient does not have an advance directive, information provided.          Objective   Vitals:    05/30/25 1110 05/30/25 1146   BP: 132/64 112/62   Pulse: 59    Temp: 97.2 °F (36.2 °C)    TempSrc: Infrared    SpO2: 98%    Weight: 63.8  "kg (140 lb 9.6 oz)    Height: 172.7 cm (68\")    PainSc: 0-No pain        Estimated body mass index is 21.38 kg/m² as calculated from the following:    Height as of this encounter: 172.7 cm (68\").    Weight as of this encounter: 63.8 kg (140 lb 9.6 oz).    BMI is within normal parameters. No other follow-up for BMI required.           Does the patient have evidence of cognitive impairment? No                                                                                                Health  Risk Assessment    Smoking Status:  Social History     Tobacco Use   Smoking Status Never   Smokeless Tobacco Never     Alcohol Consumption:  Social History     Substance and Sexual Activity   Alcohol Use Yes    Alcohol/week: 2.0 standard drinks of alcohol    Types: 2 Glasses of wine per week       Fall Risk Screen  STEADI Fall Risk Assessment was completed, and patient is at LOW risk for falls.Assessment completed on:2025    Depression Screening   Little interest or pleasure in doing things? Not at all   Feeling down, depressed, or hopeless? Not at all   PHQ-2 Total Score 0      Health Habits and Functional and Cognitive Screenin/30/2025    11:18 AM   Functional & Cognitive Status   Do you have difficulty preparing food and eating? No   Do you have difficulty bathing yourself, getting dressed or grooming yourself? No   Do you have difficulty using the toilet? No   Do you have difficulty moving around from place to place? No   Do you have trouble with steps or getting out of a bed or a chair? No   Current Diet Well Balanced Diet   Dental Exam Up to date   Eye Exam Up to date   Exercise (times per week) 0 times per week   Current Exercises Include No Regular Exercise   Do you need help using the phone?  No   Are you deaf or do you have serious difficulty hearing?  No   Do you need help to go to places out of walking distance? No   Do you need help shopping? No   Do you need help preparing meals?  No   Do you need " help with housework?  No   Do you need help with laundry? No   Do you need help taking your medications? No   Do you need help managing money? No   Do you ever drive or ride in a car without wearing a seat belt? No   Have you felt unusual stress, anger or loneliness in the last month? No   Who do you live with? Child   If you need help, do you have trouble finding someone available to you? No   Have you been bothered in the last four weeks by sexual problems? No   Do you have difficulty concentrating, remembering or making decisions? No           Age-appropriate Screening Schedule:  Refer to the list below for future screening recommendations based on patient's age, sex and/or medical conditions. Orders for these recommended tests are listed in the plan section. The patient has been provided with a written plan.    Health Maintenance List  Health Maintenance   Topic Date Due    DXA SCAN  Never done    MAMMOGRAM  Never done    COLORECTAL CANCER SCREENING  01/01/2021    ANNUAL WELLNESS VISIT  05/21/2025    Pneumococcal Vaccine 50+ (1 of 1 - PCV) 11/26/2025 (Originally 4/4/2005)    TDAP/TD VACCINES (2 - Td or Tdap) 11/26/2025 (Originally 1/1/2022)    ZOSTER VACCINE (1 of 2) 11/26/2025 (Originally 4/4/2005)    COVID-19 Vaccine (1 - 2024-25 season) 11/30/2025 (Originally 9/1/2024)    INFLUENZA VACCINE  07/01/2025    HEPATITIS C SCREENING  Completed                                                                                                                                                CMS Preventative Services Quick Reference  Risk Factors Identified During Encounter  Immunizations Discussed/Encouraged: Tdap, Prevnar 20 (Pneumococcal 20-valent conjugate), Shingrix, and COVID19  Inactivity/Sedentary: Patient was advised to exercise at least 150 minutes a week per CDC recommendations.    The above risks/problems have been discussed with the patient.  Pertinent information has been shared with the patient in the After  Visit Summary.  An After Visit Summary and PPPS were made available to the patient.    Follow Up:   Next Medicare Wellness visit to be scheduled in 1 year.         Additional E&M Note during same encounter follows:  Patient has additional, significant, and separately identifiable condition(s)/problem(s) that require work above and beyond the Medicare Wellness Visit     Chief Complaint  Medicare Wellness-subsequent    Subjective    HPI  [place] is also being seen today for additional medical problem/s.       The patient is a 70-year-old female who is here for a Medicare wellness visit. She also has hypothyroidism and is currently on levothyroxine 125 mcg daily.    She reports experiencing elevated blood pressure, which is unusual for her as it typically measures around 90/60. She describes a sensation of body tension, akin to the feeling after a fright or a nightmare, accompanied by anxiety. This sensation has been progressively worsening. She acknowledges feelings of stress and anxiety but cannot identify specific triggers. She speculates that these symptoms may be a delayed reaction to the death of her daughter a few years ago. She does not report any feelings of depression, hopelessness, or suicidal ideation. She has considered seeking counseling and support groups but finds the prospect daunting. She expresses a preference for non-pharmacological interventions.    She has been adhering to her thyroid medication regimen. She admits to a lack of exercise and dietary indiscretions, resulting in slight weight gain. She has some levothyroxine left but is not sure if the bottle she has is her last one. She has been taking her thyroid medication on an empty stomach.    She has been experiencing ocular migraines since the age of 30, which she believes may be contributing to her elevated blood pressure. These migraines typically resolve within 20 to 30 minutes of rest and relaxation, sometimes without the development of  "a headache. She has been using melatonin tea at night to improve her sleep quality due to an increase in the frequency of these migraines. She has been trying to limit her screen time and instead read books. She has recently seen her eye doctor and is due for another appointment. She plans to have a dilated eye exam at her next visit.    She does not take aspirin and has no history of cardiac issues. She has received a pamphlet on advanced directives and living thomason from the . She does not receive vaccines. She had a mammogram last year and has had one bone density screening. She has had a colonoscopy in the past but found the experience unpleasant and does not wish to repeat it. She has used a stool test kit once but has not done so since.    She reports no chest pain or palpitations. She reports no bowel or bladder issues but notes a decrease in bowel regularity compared to her previous pattern. She attempted to use a laxative but experienced diarrhea as a result.          Objective   Vital Signs:  /62   Pulse 59   Temp 97.2 °F (36.2 °C) (Infrared)   Ht 172.7 cm (68\")   Wt 63.8 kg (140 lb 9.6 oz)   SpO2 98%   BMI 21.38 kg/m²   Physical Exam  Vitals and nursing note reviewed.   Constitutional:       General: She is not in acute distress.     Appearance: Normal appearance. She is well-developed. She is not ill-appearing or diaphoretic.   HENT:      Head: Normocephalic and atraumatic.   Eyes:      General: No scleral icterus.     Conjunctiva/sclera: Conjunctivae normal.   Neck:      Thyroid: No thyroid mass, thyromegaly or thyroid tenderness.      Vascular: No carotid bruit or JVD.   Cardiovascular:      Rate and Rhythm: Normal rate and regular rhythm.      Chest Wall: PMI is not displaced. No thrill.      Heart sounds: Normal heart sounds. No murmur heard.  Pulmonary:      Effort: Pulmonary effort is normal. No accessory muscle usage or respiratory distress.      Breath sounds: Normal breath " sounds.   Chest:      Chest wall: No tenderness.   Abdominal:      General: Bowel sounds are normal. There is no distension.      Palpations: Abdomen is soft.      Tenderness: There is no abdominal tenderness.   Musculoskeletal:         General: Normal range of motion.      Cervical back: Normal range of motion.      Right lower leg: No edema.      Left lower leg: No edema.   Skin:     General: Skin is warm and dry.      Coloration: Skin is not ashen, jaundiced, mottled or pale.      Findings: No erythema.   Neurological:      General: No focal deficit present.      Mental Status: She is alert and oriented to person, place, and time.   Psychiatric:         Attention and Perception: Attention normal.         Mood and Affect: Mood and affect normal.         Behavior: Behavior normal. Behavior is cooperative.         Thought Content: Thought content normal.         Cognition and Memory: Cognition normal.         Judgment: Judgment normal.                Assessment and Plan        1. Medicare wellness visit.  - Her BMI is within the normal range. She is in good health overall, with a longer life expectancy.  - Blood work, including CBC, CMP, lipid panel, and TSH, will be ordered today.  - A mammogram, bone density screening, and Cologuard test will also be ordered.  - A referral to behavioral health for counseling will be made.    2. Hypothyroidism.  - She is currently on levothyroxine 125 mcg daily.  - Her thyroid labs will be checked today to ensure the dosage is appropriate.  - She reports consistent use of her thyroid medication.  - A prescription for levothyroxine 125 mcg daily for one year will be sent to her pharmacy.    3. Anxiety.  - She reports a sense of anxiousness and body tightness, which has been worsening.  - Counseling is recommended to help manage her symptoms.  - A referral to behavioral health for counseling will be made.  - If symptoms persist or worsen, medications may be considered as a last  resort.    4. Ocular migraines.  - She experiences ocular migraines, which sometimes resolve without headaches.  - She is advised to reduce screen time and consider reading books instead.  - A follow-up with her eye doctor for a dilated exam or imaging is recommended.  - She is due for another eye appointment and will ensure a dilated exam is performed.    5. Elevated blood pressure.  - Her blood pressure was slightly elevated during the visit but improved upon recheck to 112/62 mmHg.  - She is advised to monitor her blood pressure regularly and report any significant changes.  - Regular exercise is recommended to help manage blood pressure and overall health.  - She will follow up in 1 year for her next wellness visit.         Follow Up   Return in about 1 year (around 5/30/2026) for Medicare Wellness, and need to collect labs today.  Patient was given instructions and counseling regarding her condition or for health maintenance advice. Please see specific information pulled into the AVS if appropriate.  Patient or patient representative verbalized consent for the use of Ambient Listening during the visit with  FAMILIA Adames for chart documentation. 5/30/2025  12:59 EDT

## 2025-05-31 LAB
ALBUMIN SERPL-MCNC: 4.7 G/DL (ref 3.5–5.2)
ALBUMIN/GLOB SERPL: 1.8 G/DL
ALP SERPL-CCNC: 40 U/L (ref 39–117)
ALT SERPL W P-5'-P-CCNC: 12 U/L (ref 1–33)
ANION GAP SERPL CALCULATED.3IONS-SCNC: 13.8 MMOL/L (ref 5–15)
AST SERPL-CCNC: 17 U/L (ref 1–32)
BILIRUB SERPL-MCNC: 0.5 MG/DL (ref 0–1.2)
BUN SERPL-MCNC: 11 MG/DL (ref 8–23)
BUN/CREAT SERPL: 12 (ref 7–25)
CALCIUM SPEC-SCNC: 10.2 MG/DL (ref 8.6–10.5)
CHLORIDE SERPL-SCNC: 101 MMOL/L (ref 98–107)
CHOLEST SERPL-MCNC: 229 MG/DL (ref 0–200)
CO2 SERPL-SCNC: 25.2 MMOL/L (ref 22–29)
CREAT SERPL-MCNC: 0.92 MG/DL (ref 0.57–1)
EGFRCR SERPLBLD CKD-EPI 2021: 67.1 ML/MIN/1.73
GLOBULIN UR ELPH-MCNC: 2.6 GM/DL
GLUCOSE SERPL-MCNC: 84 MG/DL (ref 65–99)
HDLC SERPL-MCNC: 73 MG/DL (ref 40–60)
LDLC SERPL CALC-MCNC: 130 MG/DL (ref 0–100)
LDLC/HDLC SERPL: 1.72 {RATIO}
POTASSIUM SERPL-SCNC: 4.4 MMOL/L (ref 3.5–5.2)
PROT SERPL-MCNC: 7.3 G/DL (ref 6–8.5)
SODIUM SERPL-SCNC: 140 MMOL/L (ref 136–145)
T4 FREE SERPL-MCNC: 1.16 NG/DL (ref 0.92–1.68)
TRIGL SERPL-MCNC: 151 MG/DL (ref 0–150)
VLDLC SERPL-MCNC: 26 MG/DL (ref 5–40)

## 2025-06-11 ENCOUNTER — RESULTS FOLLOW-UP (OUTPATIENT)
Dept: INTERNAL MEDICINE | Facility: CLINIC | Age: 70
End: 2025-06-11
Payer: MEDICARE

## 2025-06-11 DIAGNOSIS — E03.9 HYPOTHYROIDISM (ACQUIRED): Primary | ICD-10-CM

## 2025-07-25 ENCOUNTER — OFFICE VISIT (OUTPATIENT)
Dept: BEHAVIORAL HEALTH | Facility: CLINIC | Age: 70
End: 2025-07-25
Payer: MEDICARE

## 2025-07-25 DIAGNOSIS — F33.1 MAJOR DEPRESSIVE DISORDER, RECURRENT EPISODE, MODERATE: Primary | ICD-10-CM

## 2025-07-25 NOTE — PROGRESS NOTES
Initial Assessment Note   Date: 07/25/2025   Client Name: Patricia Behrens  MRN: [id number]  Start Time: 0900  End Time: 1000     Diagnoses and all orders for this visit:    1. Major depressive disorder, recurrent episode, moderate (Primary)         Active Symptoms/Chief Complainant:   Depressed mood, lack of motivation, lack of interest in things she previously enjoyed, feeling a lack of purpose, loneliness.    Reported History     Hx of Presenting problem:   Client reported seeking services today due to struggling with an increase in depressive symptoms.  Client reports a history of trauma related to her adult daughter passing away and feels as though her primary coping mechanism was taking care of her grandchildren.  Client reports that as her grandchildren have gotten older she has begun to struggle with the insecurity of not feeling the needed anymore.  Client reports feeling unmotivated to do things that she previously enjoyed and shared that she struggles doing anything to take care of herself.  Client reports previously taking Wellbutrin and having positive results from it.  Clinician recommended client to discuss this with her med provider.  Client's strengths include closeness with family and positive connection with katarina.       Trauma Assessment:   Client reported a HX of trauma, which includes the death of her daughter and divorce from her .    Symptoms associated with experienced trauma:     Powerlessness, Loss of control, Abandonment, Sleep disturbance, Intrusive thoughts or Memories, and Avoidence    Summary:     Clinician will utilize and trauma focused modality to aid the Client in their treatment.           Family HX of Mental Health Conditions:   Client reported no family history of mental health concerns    Previous Treatment HX/MH Hospitalizations:   Client reported attending therapy number of years ago and had positive results.  Client also reports a history of taking Wellbutrin  as an antidepressant and having positive response to the med.       PHQ-9  Little interest or pleasure in doing things? Several days   Feeling down, depressed, or hopeless? Several days   PHQ-2 Total Score 2   Trouble falling or staying asleep, or sleeping too much? Nearly every day   Feeling tired or having little energy? Nearly every day   Poor appetite or overeating? Not at all   Feeling bad about yourself - or that you are a failure or have let yourself or your family down? Not at all   Trouble concentrating on things, such as reading the newspaper or watching television? Several days   Moving or speaking so slowly that other people could have noticed? Or the opposite - being so fidgety or restless that you have been moving around a lot more than usual? Not at all     Thoughts that you would be better off dead, or of hurting yourself in some way? Not at all   PHQ-9 Total Score 9   If you checked off any problems, how difficult have these problems made it for you to do your work, take care of things at home, or get along with other people?             KEEGAN-7  KEEGAN 7 Total Score: 9         Mental Status Exam  Appearance:  clean and casually dressed, appropriate  Attitude toward clinician:  cooperative and agreeable   Speech:    Rate:  regular rate and rhythm   Volume:  normal  Affect:  euthymic  Thought Processes:  linear, logical, and goal directed  Thought Content:  normal  Suicidal Thoughts:  absent  Homicidal Thoughts:  absent  Perceptual Disturbance: no perceptual disturbance  Attention and Concentration:  good  Insight and Judgement:  good  Memory:  memory appears to be intact       Support System and Protective Factors     Client reported having the following support system:   Children, grandchildren, and her katarina.    Client described their interactions with their support system as positive and regular    Does Client have a responsibility to care for children or pets at this time?   Client has 2 cats and  lives with her adult grandson    Level of Client's current coping skills:   Client has limited coping Skills.    Does Client have plans for the future that extend beyond one week?   Yes    Can Client provide a reason for living?   Yes    Does Client feel like their life has a purpose?   Yes but she was concerned that this purpose may fade.    Does Client feel safe in their living environment?     Yes       Short Term goal for treatment:   “ Learn and implement positive coping mechanisms.”     Long Term goal for treatment:   “ Improve overall quality of life.”     Services Client is Seeking:  Psychotherapy     [place] Suicide Severity Rating (C-SSRS)  In the past month, have you wished you were dead or wished you could go to sleep and not wake up? No     In the past month, have you actually had any thoughts of killing yourself? No     Have you been thinking about how you might do this?       Have you had these thoughts and had some intention of acting on them?       Have you started to work out or have you worked out the details of how to kill yourself?       Have you ever in your lifetime done anything, started to do anything, or prepared to do anything to end your life? No       Was this within the past three months?       Level of Risk per Screen No Risk Indicated        C.A.G.E.  C: Have you ever felt like he needed to cut down on your drinking or drug use?  No   A: Have people annoyed you by criticizing your drinking or drug use?  No   G: Have you ever felt bad or guilty about your drinking or drug use? No   E: Have you ever had a drink first thing in the morning to steady her nerves or to get rid of a hangover? No       Risk of Harm to Self:   Low    Client reported client denies past or present thoughts of harming self    Risk of Harm to Others:   Low    Client reported client denies past or present thoughts to harm others       Initial Session Narrative     Clinical Formulation  Client reported seeking  services today due to struggling with an increase in depressive symptoms.  Client reports a history of trauma related to her adult daughter passing away and feels as though her primary coping mechanism was taking care of her grandchildren.  Client reports that as her grandchildren have gotten older she has begun to struggle with the insecurity of not feeling the needed anymore.  Client reports feeling unmotivated to do things that she previously enjoyed and shared that she struggles doing anything to take care of herself.  Client reports previously taking Wellbutrin and having positive results from it.  Clinician recommended client to discuss this with her med provider.  Client's strengths include closeness with family and positive connection with katarina.    Client denies to having a HX of SI, HI, substance misuse, aggressive physical behaviors, or psychiatric hospitalizations.     Client reported they live in her home with her adult grandson.    Client's current coping skills consist of reading and going for walks.    Per Client's reports, Client meets the criteria for  Major depressive disorder, recurrent, moderate  Clinician will continue to screen for generalized anxiety disorder    ?   Initial intervention/Client Response:   Clinician met with Client in person at [facility].     Clinician completed initial assessment, [place] Suicide Related Assessment, safety plan, CAGE addiction assessment, PHQ-9, KEEGAN 7, trauma assessment, and explored the Client's protective factors and strengthens.     Clinician explained permission to treat, confidentiality, the limitations of confidentiality, and discussed NO SHOW policy.     Clinician utilized the MI technique of active listening and open ended questions, to gather information, provide validation, assess barriers/readiness for change, and build rapport.     Clinician provided the Client with information on DX and possible treatment methods i.e.,  CBT/DBT/SFT/EMDR.    Client was receptive throughout the session and agreed to work with this Clinician to obtain their treatment goals.     Follow-up:    Clinician plans to complete any outstanding assessments needed for treatment and complete the Client's Care/Treatment Plan with the client during the next session.      Employment: retired    Education: Is the client currently enrolled or attending an education or vocation program?no    Legal History: Client denies legal involvement.    Dictated using Dragon Speech Recognition.    Elliot Recinos LCSW  [facility]

## 2025-08-29 ENCOUNTER — OFFICE VISIT (OUTPATIENT)
Dept: BEHAVIORAL HEALTH | Facility: CLINIC | Age: 70
End: 2025-08-29
Payer: MEDICARE

## 2025-08-29 DIAGNOSIS — F33.1 MAJOR DEPRESSIVE DISORDER, RECURRENT EPISODE, MODERATE: Primary | ICD-10-CM
